# Patient Record
Sex: FEMALE
[De-identification: names, ages, dates, MRNs, and addresses within clinical notes are randomized per-mention and may not be internally consistent; named-entity substitution may affect disease eponyms.]

---

## 2018-02-10 ENCOUNTER — HOSPITAL ENCOUNTER (EMERGENCY)
Dept: HOSPITAL 88 - ER | Age: 23
Discharge: HOME | End: 2018-02-10
Payer: COMMERCIAL

## 2018-02-10 VITALS — WEIGHT: 130 LBS | HEIGHT: 59 IN | BODY MASS INDEX: 26.21 KG/M2

## 2018-02-10 DIAGNOSIS — R11.2: ICD-10-CM

## 2018-02-10 DIAGNOSIS — R19.7: ICD-10-CM

## 2018-02-10 DIAGNOSIS — R10.11: Primary | ICD-10-CM

## 2018-02-10 DIAGNOSIS — R10.13: ICD-10-CM

## 2018-02-10 LAB
ALBUMIN SERPL-MCNC: 4.5 G/DL (ref 3.5–5)
ALBUMIN/GLOB SERPL: 1.1 {RATIO} (ref 0.8–2)
ALP SERPL-CCNC: 94 IU/L (ref 40–150)
ALT SERPL-CCNC: 27 IU/L (ref 0–55)
AMYLASE SERPL-CCNC: 75 U/L (ref 25–125)
ANION GAP SERPL CALC-SCNC: 15.2 MMOL/L (ref 8–16)
BASOPHILS # BLD AUTO: 0 10*3/UL (ref 0–0.1)
BASOPHILS NFR BLD AUTO: 0.1 % (ref 0–1)
BUN SERPL-MCNC: 13 MG/DL (ref 7–26)
BUN/CREAT SERPL: 16 (ref 6–25)
CALCIUM SERPL-MCNC: 9.7 MG/DL (ref 8.4–10.2)
CHLORIDE SERPL-SCNC: 107 MMOL/L (ref 98–107)
CO2 SERPL-SCNC: 22 MMOL/L (ref 22–29)
DEPRECATED NEUTROPHILS # BLD AUTO: 10.2 10*3/UL (ref 2.1–6.9)
EGFRCR SERPLBLD CKD-EPI 2021: > 60 ML/MIN (ref 60–?)
EOSINOPHIL # BLD AUTO: 0 10*3/UL (ref 0–0.4)
EOSINOPHIL NFR BLD AUTO: 0.2 % (ref 0–6)
ERYTHROCYTE [DISTWIDTH] IN CORD BLOOD: 12.4 % (ref 11.7–14.4)
GLOBULIN PLAS-MCNC: 4 G/DL (ref 2.3–3.5)
GLUCOSE SERPLBLD-MCNC: 91 MG/DL (ref 74–118)
HCT VFR BLD AUTO: 42.4 % (ref 34.2–44.1)
HGB BLD-MCNC: 14.5 G/DL (ref 12–16)
LIPASE SERPL-CCNC: 16 U/L (ref 8–78)
LYMPHOCYTES # BLD: 0.7 10*3/UL (ref 1–3.2)
LYMPHOCYTES NFR BLD AUTO: 6.3 % (ref 18–39.1)
MCH RBC QN AUTO: 31.8 PG (ref 28–32)
MCHC RBC AUTO-ENTMCNC: 34.2 G/DL (ref 31–35)
MCV RBC AUTO: 93 FL (ref 81–99)
MONOCYTES # BLD AUTO: 0.5 10*3/UL (ref 0.2–0.8)
MONOCYTES NFR BLD AUTO: 4 % (ref 4.4–11.3)
NEUTS SEG NFR BLD AUTO: 88.8 % (ref 38.7–80)
PLATELET # BLD AUTO: 290 X10E3/UL (ref 140–360)
POTASSIUM SERPL-SCNC: 4.2 MMOL/L (ref 3.5–5.1)
RBC # BLD AUTO: 4.56 X10E6/UL (ref 3.6–5.1)
SODIUM SERPL-SCNC: 140 MMOL/L (ref 136–145)

## 2018-02-10 PROCEDURE — 82150 ASSAY OF AMYLASE: CPT

## 2018-02-10 PROCEDURE — 83690 ASSAY OF LIPASE: CPT

## 2018-02-10 PROCEDURE — 84702 CHORIONIC GONADOTROPIN TEST: CPT

## 2018-02-10 PROCEDURE — 99284 EMERGENCY DEPT VISIT MOD MDM: CPT

## 2018-02-10 PROCEDURE — 76705 ECHO EXAM OF ABDOMEN: CPT

## 2018-02-10 PROCEDURE — 85025 COMPLETE CBC W/AUTO DIFF WBC: CPT

## 2018-02-10 PROCEDURE — 80053 COMPREHEN METABOLIC PANEL: CPT

## 2018-02-10 PROCEDURE — 36415 COLL VENOUS BLD VENIPUNCTURE: CPT

## 2018-02-10 NOTE — DIAGNOSTIC IMAGING REPORT
EXAM: Right Upper Quadrant Ultrasound

INDICATION:        Abdominal pain, vomiting, diarrhea

COMPARISON: None. 

TECHNIQUE: Transverse and longitudinal images of the right upper abdomen were

obtained. 



FINDINGS:     

Liver:

Size: 13.8 cm in the right midclavicular line, normal

Appearance: Normal echogenicity, smooth contour

Mass: No focal masses



Gallbladder:

Stones/Sludge: None

Wall: 0.2 cm

Appearance: No wall thickening, pericholecystic fluid or hydrops.  

Sonographic Reyna's Sign: Negative



Bile Ducts:

Intrahepatic Ducts: No dilatation

Extrahepatic Ducts: Common bile duct measures 0.2 cm, no dilatation



Pancreas:

Visualized portions of the pancreatic head, neck and proximal body are normal.



Kidneys:

Length: 

Right 8.3 cm             

Echogenicity:  Normal

Collecting System:  No hydronephrosis

Stone:  None

Cyst/Mass: None



Vessels:

Aorta: Visualized portions are normal

Inferior Vena Cava: Visualized portions are normal

Main Portal Vein: 1.1 cm, normal size with hepatopetal flow.



Free Fluid:

No ascites or pleural effusion



IMPRESSION:

Normal right upper quadrant ultrasound.



Signed by: Dr. Don Bolaños M.D. on 2/10/2018 9:31 PM